# Patient Record
Sex: FEMALE | Race: ASIAN
[De-identification: names, ages, dates, MRNs, and addresses within clinical notes are randomized per-mention and may not be internally consistent; named-entity substitution may affect disease eponyms.]

---

## 2019-06-26 ENCOUNTER — HOSPITAL ENCOUNTER (EMERGENCY)
Dept: HOSPITAL 74 - JERFT | Age: 54
Discharge: HOME | End: 2019-06-26
Payer: COMMERCIAL

## 2019-06-26 VITALS — TEMPERATURE: 98.4 F | SYSTOLIC BLOOD PRESSURE: 157 MMHG | HEART RATE: 102 BPM | DIASTOLIC BLOOD PRESSURE: 78 MMHG

## 2019-06-26 VITALS — BODY MASS INDEX: 32 KG/M2

## 2019-06-26 DIAGNOSIS — J06.9: Primary | ICD-10-CM

## 2019-06-26 NOTE — PDOC
History of Present Illness





- General


Chief Complaint: Respiratory


Stated Complaint: chest pain


Time Seen by Provider: 06/26/19 21:04


History Source: Patient, Family


Exam Limitations: No Limitations





- History of Present Illness


Initial Comments: 





06/26/19 21:47


patient is here after lengthyupper respiratory illness. States has been 

attended to by Dr. Villasenor and given multiple rounds of different antibiotics with 

minimal resolved. States has continued persistent coughing. Has never seen a 

specialist. Is using over-the-counter medications with minimal resolved.


Timing/Duration: reports: getting worse, intermittent


Severity: reports: moderate, severe


Associated Symptoms: reports: chest pain/soreness (primarily pleuritic), cough





Past History





- Travel


Traveled outside of the country in the last 30 days: No


Close contact w/someone who was outside of country & ill: No





- Past Medical History


Allergies/Adverse Reactions: 


 Allergies











Allergy/AdvReac Type Severity Reaction Status Date / Time


 


No Known Allergies Allergy   Verified 06/26/19 21:06











Home Medications: 


Ambulatory Orders





Albuterol Sulfate Inhaler - [Ventolin HFA Inhaler -] 1 - 2 inh PO Q4H #1 

inhaler 06/26/19 








COPD: No


Diabetes: Yes





- Suicide/Smoking/Psychosocial Hx


Smoking History: Never smoked





**Review of Systems





- Review of Systems


Able to Perform ROS?: Yes


Is the patient limited English proficient: Yes


Constitutional: Yes: Symptoms Reported, See HPI, Fever, Malaise


HEENTM: Yes: Symptoms Reported, Nose Congestion


Respiratory: Yes: Symptoms reported, See HPI, Cough


Cardiac (ROS): No: Symptoms Reported


Integumentary: Yes: Symptoms Reported


Neurological: No: Symptoms reported





*Physical Exam





- Vital Signs


 Last Vital Signs











Temp Pulse Resp BP Pulse Ox


 


 98.4 F   102 H  20   157/78   99 


 


 06/26/19 21:03  06/26/19 21:03  06/26/19 21:03  06/26/19 21:03  06/26/19 21:03














- Physical Exam


General Appearance: Yes: Nourished, Appropriately Dressed, Apparent Distress, 

Mild Distress


HEENT: positive: EOMI, JUAN LUIS, Normal ENT Inspection, TMs Normal, Pharynx Normal


Neck: positive: Tender, Supple


Respiratory/Chest: positive: Chest Tender (mild pleuritic pain with deep 

inspiration ), Lungs Clear, Normal Breath Sounds


Gastrointestinal/Abdominal: positive: Normal Bowel Sounds, Tender, Soft


Musculoskeletal: positive: Normal Inspection


Extremity: positive: Normal Capillary Refill, Normal Inspection.  negative: 

Tender


Integumentary: positive: Normal Color, Dry, Warm, Pale


Neurologic: positive: CNs II-XII NML intact, Fully Oriented, Alert, Normal Mood/

Affect





ED Treatment Course





- Medications


Given in the ED: 


ED Medications














Discontinued Medications














Generic Name Dose Route Start Last Admin





  Trade Name Ariana  PRN Reason Stop Dose Admin


 


Dexamethasone  10 mg  06/26/19 21:06  06/26/19 21:35





  Decadron Liquid -  PO  06/26/19 21:07  10 mg





  ONCE ONE   Administration





     





     





     





     














Progress Note





- Progress Note


Progress Note: 





chest x-ray showsno changein 2 years including round lesion noted middle right 

upper lobe, continues to view approximately 1 centimeters2 in upper right lobe 

eupper respiratory infection, much improved after DuoNeb and Decadron 

administration.much better aeration and states feels better.  We'll prescribe 

albuterol inhaler and have follow-up with PMD this week





*DC/Admit/Observation/Transfer


Diagnosis at time of Disposition: 


 URI, acute








- Discharge Dispostion


Disposition: HOME


Condition at time of disposition: Stable


Decision to Admit order: No





- Referrals


Referrals: 


Estelita Bragg MD [Primary Care Provider] - 





- Patient Instructions


Printed Discharge Instructions:  DI for Viral Upper Respiratory Infection -- 

Adult


Additional Instructions: 


Rest, drink lots of fluids: Teas, water, soups, Pedialyte


Saltwater gargles


Steamy showers/seem to face break up mucus


Avoid contact with others until fevers and cough resolved


Lots of handwashing and good hygiene





Continue over-the-counter medications for symptomatic relief


Tylenol or Motrin for fever and pain


Continue albuterol inhalers  every 4-6 hours for the next 2 days then as needed 

for continued cough


You received 10 mg of Decadron in the emergency department for 1 time steroid





Followup with private physician in one to 2 days 


Return to emergency department / pediatric hospital for worsened symptoms, 

fevers, dehydration





- Post Discharge Activity

## 2019-06-26 NOTE — PDOC
Rapid Medical Evaluation


Time Seen by Provider: 06/26/19 21:04


Medical Evaluation: 





06/26/19 21:04


HPI: Cough x2 months now with CP and headache on amoxicillin for cough 





PE: No distress, Decreased lung sounds at the L base otherwise clear; 

reproducable L sided CP 





ORDERS: CXR, Decadron


06/26/19 21:07








**Discharge Disposition





- Diagnosis


 Cough








- Referrals





- Patient Instructions





- Post Discharge Activity